# Patient Record
(demographics unavailable — no encounter records)

---

## 2024-11-22 NOTE — PHYSICAL EXAM
[Left] : left shoulder [5 ___] : forward flexion 5[unfilled]/5 [5___] : external rotation 5[unfilled]/5 [] : negative Speed's [FreeTextEntry9] : FE: 140 ER: 40

## 2024-11-22 NOTE — HISTORY OF PRESENT ILLNESS
[7] : 7 [3] : 3 [Dull/Aching] : dull/aching [Sharp] : sharp [Intermittent] : intermittent [Leisure] : leisure [Meds] : meds [Standing] : standing [Walking] : walking [Exercising] : exercising [Full time] : Work status: full time [de-identified] : WC DOI: 11/14/24 [employer]ician  11/22/24: 25 y/o RHD male here today for the L shoulder.  He was lifting an emergency slide for an aircraft into position, which weighs about  pound and requires two individuals to lift.   He reports he felt a pain in the shoulder and arm.   He feels pain anterior, worse with lifting and reaching behind the back.  He has some pain radiating down the arm to the wrist.  Denies neck pain, numbness/ tingling.  States that shoulder was swollen on day on injury but not today.  He tried ice and Aleve.  He did go to urgent care.  No prior shoulder injuries.  He is OOW.   PMHx: asthma [] : no [FreeTextEntry1] : LT Shoulder [FreeTextEntry7] : LT wrist [FreeTextEntry9] : Aspirin [de-identified] :

## 2024-12-20 NOTE — HISTORY OF PRESENT ILLNESS
[Dull/Aching] : dull/aching [Sharp] : sharp [Intermittent] : intermittent [Leisure] : leisure [Meds] : meds [Standing] : standing [Walking] : walking [Exercising] : exercising [5] : 5 [1] : 2 [Not working due to injury] : Work status: not working due to injury [de-identified] : WC DOI: 11/14/24 [employer]  12/20/24: Patient is here for L shoulder MRI results. reports decreased pain.   MRI L shoulder (oc) - bursitis, capsulitis, ac sprain, axillary lymph nodes present.   11/22/24: 25 y/o RHD male here today for the L shoulder.  He was lifting an emergency slide for an aircraft into position, which weighs about  pound and requires two individuals to lift.   He reports he felt a pain in the shoulder and arm.   He feels pain anterior, worse with lifting and reaching behind the back.  He has some pain radiating down the arm to the wrist.  Denies neck pain, numbness/ tingling.  States that shoulder was swollen on day on injury but not today.  He tried ice and Aleve.  He did go to urgent care.  No prior shoulder injuries.  He is OOW.   PMHx: asthma [] : no [FreeTextEntry1] : LT Shoulder [FreeTextEntry9] : Aspirin [de-identified] :

## 2024-12-20 NOTE — DATA REVIEWED
[MRI] : MRI [Left] : left [Shoulder] : shoulder [I independently reviewed and interpreted images and report] : I independently reviewed and interpreted images and report [FreeTextEntry1] : bursitis, capsulitis, ac sprain, axillary lymph nodes present.

## 2024-12-20 NOTE — ASSESSMENT
[FreeTextEntry1] : L shoulder bursitis/ strain.  MRI L shoulder (oc) - bursitis, capsulitis, ac sprain, axillary lymph nodes present.  Recommend PCP eval for lymph nodes present on MRI.  PT. Diclofenac 75mg BID prn pain.  NO work.  RTO in 4-6 weeks.

## 2025-01-29 NOTE — ASSESSMENT
[FreeTextEntry1] : L shoulder bursitis/ strain.  MRI L shoulder (oc) - bursitis, capsulitis, ac sprain, axillary lymph nodes present.  Recommend PCP eval for lymph nodes present on MRI.  He is in PT, he will continue.  Diclofenac 75mg BID prn pain.  OOW.  RTO in 6 weeks.

## 2025-01-29 NOTE — HISTORY OF PRESENT ILLNESS
[5] : 5 [1] : 2 [Dull/Aching] : dull/aching [Sharp] : sharp [Intermittent] : intermittent [Leisure] : leisure [Meds] : meds [Standing] : standing [Walking] : walking [Exercising] : exercising [Not working due to injury] : Work status: not working due to injury [de-identified] : WC DOI: 11/14/24 [employer]  01/29/25 - patient is here for L shoulder. continues PT, good help.  has seen slow improvement. some pain in morning, ache sensation. can't carry heavy objects.    12/20/24: Patient is here for L shoulder MRI results. reports decreased pain.   MRI L shoulder (oc) - bursitis, capsulitis, ac sprain, axillary lymph nodes present.   11/22/24: 23 y/o RHD male here today for the L shoulder.  He was lifting an emergency slide for an aircraft into position, which weighs about  pound and requires two individuals to lift.   He reports he felt a pain in the shoulder and arm.   He feels pain anterior, worse with lifting and reaching behind the back.  He has some pain radiating down the arm to the wrist.  Denies neck pain, numbness/ tingling.  States that shoulder was swollen on day on injury but not today.  He tried ice and Aleve.  He did go to urgent care.  No prior shoulder injuries.  He is OOW.   PMHx: asthma [] : no [FreeTextEntry1] : LT Shoulder [FreeTextEntry9] : Aspirin [de-identified] :

## 2025-03-12 NOTE — HISTORY OF PRESENT ILLNESS
[5] : 5 [1] : 2 [Dull/Aching] : dull/aching [Sharp] : sharp [Intermittent] : intermittent [Leisure] : leisure [Meds] : meds [Standing] : standing [Walking] : walking [Exercising] : exercising [Not working due to injury] : Work status: not working due to injury [de-identified] : WC DOI: 11/14/24 [employer]  03/12/2025: Follow up L shoulder.  Improved with PT.  No pain.   01/29/25 - patient is here for L shoulder. continues PT, good help.  has seen slow improvement. some pain in morning, ache sensation. can't carry heavy objects.    12/20/24: Patient is here for L shoulder MRI results. reports decreased pain.   MRI L shoulder (oc) - bursitis, capsulitis, ac sprain, axillary lymph nodes present.   11/22/24: 25 y/o RHD male here today for the L shoulder.  He was lifting an emergency slide for an aircraft into position, which weighs about  pound and requires two individuals to lift.   He reports he felt a pain in the shoulder and arm.   He feels pain anterior, worse with lifting and reaching behind the back.  He has some pain radiating down the arm to the wrist.  Denies neck pain, numbness/ tingling.  States that shoulder was swollen on day on injury but not today.  He tried ice and Aleve.  He did go to urgent care.  No prior shoulder injuries.  He is OOW.   PMHx: asthma [] : no [FreeTextEntry1] : LT Shoulder [FreeTextEntry5] : patient is here for a follow up on the LEFT SHOULDER. Patient notes improvement and now has no pain. Patient has finished PT and has began weight training. patient notes slight aches after exercises. [FreeTextEntry9] : Aspirin [de-identified] :

## 2025-03-12 NOTE — ASSESSMENT
[FreeTextEntry1] : L shoulder bursitis/ strain.  MRI L shoulder (oc) - bursitis, capsulitis, ac sprain, axillary lymph nodes present.  Recommend PCP eval for lymph nodes present on MRI.  Improving with PT.  He is now transitioned out.  HEP.  Diclofenac 75mg BID prn pain.  RTW full duty on 3/17/25.  RTO in 2-3 months.

## 2025-03-12 NOTE — WORK
[Does not reveal pre-existing condition(s) that may affect treatment/prognosis] : does not reveal pre-existing condition(s) that may affect treatment/prognosis [Mild Partial] : mild partial [Can return to work without limitations on ______] : can return to work without limitations on [unfilled] [Unknown at this time] : : unknown at this time [Patient] : patient [No Rx restrictions] : No Rx restrictions. [I provided the services listed above] :  I provided the services listed above.